# Patient Record
Sex: MALE | Race: WHITE | Employment: OTHER | URBAN - METROPOLITAN AREA
[De-identification: names, ages, dates, MRNs, and addresses within clinical notes are randomized per-mention and may not be internally consistent; named-entity substitution may affect disease eponyms.]

---

## 2018-04-23 ENCOUNTER — OP HISTORICAL/CONVERTED ENCOUNTER (OUTPATIENT)
Dept: OTHER | Age: 73
End: 2018-04-23

## 2018-05-24 ENCOUNTER — IP HISTORICAL/CONVERTED ENCOUNTER (OUTPATIENT)
Dept: OTHER | Age: 73
End: 2018-05-24

## 2021-02-19 ENCOUNTER — TRANSCRIBE ORDER (OUTPATIENT)
Dept: SCHEDULING | Age: 76
End: 2021-02-19

## 2021-02-19 DIAGNOSIS — R10.9 LEFT FLANK PAIN: Primary | ICD-10-CM

## 2021-02-19 DIAGNOSIS — D69.6 THROMBOCYTOPENIA (HCC): ICD-10-CM

## 2021-03-02 ENCOUNTER — HOSPITAL ENCOUNTER (OUTPATIENT)
Dept: CT IMAGING | Age: 76
Discharge: HOME | End: 2021-03-02
Attending: FAMILY MEDICINE
Payer: MEDICARE

## 2021-03-02 DIAGNOSIS — D69.6 THROMBOCYTOPENIA (HCC): ICD-10-CM

## 2021-03-02 DIAGNOSIS — R10.9 LEFT FLANK PAIN: ICD-10-CM

## 2021-03-02 PROCEDURE — 74011000636 HC RX REV CODE- 636: Performed by: FAMILY MEDICINE

## 2021-03-02 PROCEDURE — 74177 CT ABD & PELVIS W/CONTRAST: CPT

## 2021-03-02 RX ADMIN — IOPAMIDOL 100 ML: 755 INJECTION, SOLUTION INTRAVENOUS at 14:26

## 2023-06-28 ENCOUNTER — HOSPITAL ENCOUNTER (OUTPATIENT)
Facility: HOSPITAL | Age: 78
Discharge: HOME | End: 2023-07-01
Payer: MEDICARE

## 2023-06-28 DIAGNOSIS — R31.0 GROSS HEMATURIA: ICD-10-CM

## 2023-06-28 LAB — CREAT BLD-MCNC: 1.1 MG/DL (ref 0.6–1.3)

## 2023-06-28 PROCEDURE — 6360000004 HC RX CONTRAST MEDICATION: Performed by: FAMILY MEDICINE

## 2023-06-28 PROCEDURE — 74178 CT ABD&PLV WO CNTR FLWD CNTR: CPT

## 2023-06-28 PROCEDURE — 82565 ASSAY OF CREATININE: CPT

## 2023-06-28 RX ADMIN — IOPAMIDOL 100 ML: 755 INJECTION, SOLUTION INTRAVENOUS at 12:17

## 2023-10-10 ENCOUNTER — OFFICE VISIT (OUTPATIENT)
Age: 78
End: 2023-10-10
Payer: MEDICARE

## 2023-10-10 VITALS
TEMPERATURE: 97.8 F | HEIGHT: 69 IN | DIASTOLIC BLOOD PRESSURE: 74 MMHG | WEIGHT: 168 LBS | HEART RATE: 78 BPM | OXYGEN SATURATION: 99 % | BODY MASS INDEX: 24.88 KG/M2 | SYSTOLIC BLOOD PRESSURE: 102 MMHG

## 2023-10-10 DIAGNOSIS — N40.1 BENIGN PROSTATIC HYPERPLASIA WITH WEAK URINARY STREAM: ICD-10-CM

## 2023-10-10 DIAGNOSIS — R35.0 FREQUENCY OF URINATION: Primary | ICD-10-CM

## 2023-10-10 DIAGNOSIS — R31.0 GROSS HEMATURIA: ICD-10-CM

## 2023-10-10 DIAGNOSIS — R39.12 BENIGN PROSTATIC HYPERPLASIA WITH WEAK URINARY STREAM: ICD-10-CM

## 2023-10-10 LAB
BILIRUBIN, URINE, POC: NEGATIVE
BLOOD URINE, POC: NEGATIVE
GLUCOSE URINE, POC: NEGATIVE
KETONES, URINE, POC: NEGATIVE
LEUKOCYTE ESTERASE, URINE, POC: NEGATIVE
NITRITE, URINE, POC: NEGATIVE
PH, URINE, POC: 5.5 (ref 4.6–8)
PROTEIN,URINE, POC: NEGATIVE
SPECIFIC GRAVITY, URINE, POC: 1.02 (ref 1–1.03)
URINALYSIS CLARITY, POC: CLEAR
URINALYSIS COLOR, POC: YELLOW
UROBILINOGEN, POC: NORMAL

## 2023-10-10 PROCEDURE — 99204 OFFICE O/P NEW MOD 45 MIN: CPT | Performed by: UROLOGY

## 2023-10-10 PROCEDURE — G8427 DOCREV CUR MEDS BY ELIG CLIN: HCPCS | Performed by: UROLOGY

## 2023-10-10 PROCEDURE — 81003 URINALYSIS AUTO W/O SCOPE: CPT | Performed by: UROLOGY

## 2023-10-10 PROCEDURE — G8420 CALC BMI NORM PARAMETERS: HCPCS | Performed by: UROLOGY

## 2023-10-10 PROCEDURE — 4004F PT TOBACCO SCREEN RCVD TLK: CPT | Performed by: UROLOGY

## 2023-10-10 PROCEDURE — 1123F ACP DISCUSS/DSCN MKR DOCD: CPT | Performed by: UROLOGY

## 2023-10-10 PROCEDURE — G8484 FLU IMMUNIZE NO ADMIN: HCPCS | Performed by: UROLOGY

## 2023-10-10 RX ORDER — CYCLOBENZAPRINE HCL 5 MG
5 TABLET ORAL
COMMUNITY
Start: 2023-08-07

## 2023-10-10 RX ORDER — LOSARTAN POTASSIUM 50 MG/1
50 TABLET ORAL
COMMUNITY
Start: 2023-10-05

## 2023-10-10 RX ORDER — TADALAFIL 5 MG/1
5 TABLET ORAL DAILY
COMMUNITY
Start: 2022-02-23

## 2023-10-10 RX ORDER — FINASTERIDE 5 MG/1
5 TABLET, FILM COATED ORAL DAILY
COMMUNITY
Start: 2023-07-26

## 2023-10-10 RX ORDER — BENZONATATE 200 MG/1
CAPSULE ORAL
COMMUNITY
Start: 2023-09-18

## 2023-10-10 RX ORDER — DILTIAZEM HYDROCHLORIDE 120 MG/1
120 CAPSULE, COATED, EXTENDED RELEASE ORAL DAILY
Qty: 30 CAPSULE | Refills: 11 | COMMUNITY
Start: 2023-07-12 | End: 2024-07-11

## 2023-10-10 RX ORDER — RIVAROXABAN 15 MG/1
15 TABLET, FILM COATED ORAL DAILY
COMMUNITY
Start: 2023-09-14

## 2023-10-10 RX ORDER — DOXYCYCLINE HYCLATE 100 MG/1
CAPSULE ORAL
COMMUNITY
Start: 2023-09-18

## 2023-10-10 RX ORDER — ATORVASTATIN CALCIUM 20 MG/1
20 TABLET, FILM COATED ORAL DAILY
COMMUNITY
Start: 2023-08-07

## 2023-10-10 RX ORDER — TAMSULOSIN HYDROCHLORIDE 0.4 MG/1
CAPSULE ORAL
COMMUNITY
Start: 2023-09-15

## 2023-10-10 NOTE — PROGRESS NOTES
HISTORY OF PRESENT ILLNESS  Maddy Jensen is a 78 y.o. male. has no past medical history on file. has no past surgical history on file. Chief Complaint   Patient presents with    Annual Exam    New Patient    Urinary Urgency    Urinary Frequency at Night    Other     Incomplete bladder emptying for years      Years of urinary frequency and incomplete emptying. May he had gross hematuria. It was after intercourse. He associates dark urine after ejaculation. He has thrombocytopenia and also takes a blood thinner. He sees Dr. Brennon Young. He went to [facility] and decided to have another opinion. He has not consistently seen the same doctor. He had a CT around 7/3/23 with a tiny left kidney stone. He had prostate enlargement. PSA 0.87 6/2021. Tamsulosin for BPH. He is also on tadalafil 5mg daily. He is on finasteride. He notes nocturia several times. It is stable and he is used to it. Daytime he denies hesitancy but notes a weaker stream.  He does not feel he empties. 1. Frequency of urination  Assessment & Plan: On finasteride, tamsulosin and tadalafil. Still bothered. Check cmg/uroflow/ PVR  Orders:  -     AMB POC URINALYSIS DIP STICK AUTO W/O MICRO  2. Benign prostatic hyperplasia with weak urinary stream  Assessment & Plan:  Subjective incomplete emptying and weaker urine stream.  We will evaluate. 3. Gross hematuria  Assessment & Plan:  Recurrent hematuria. Plan on cystoscopy. Past Medical History:  PMHx (including negatives):  has no past medical history on file. PSurgHx:  has no past surgical history on file. PSocHx:     FamilyHX: No family history on file. Review of Systems   Cardiovascular:  Positive for palpitations (intermittent afib). All other systems reviewed and are negative. Physical Exam  Constitutional:       General: He is not in acute distress. Appearance: He is not ill-appearing.    HENT:      Head: Normocephalic and

## 2023-11-08 PROBLEM — R35.0 FREQUENCY OF URINATION: Status: RESOLVED | Noted: 2023-10-10 | Resolved: 2023-11-08

## 2023-11-08 NOTE — ASSESSMENT & PLAN NOTE
BPH with feelings of incomplete emptying (subjective), frequency, weak stream. On tamsulosin and finasteride. Flow is moderate to severely restricted. We discussed management options. I would offer a laser enucleation of the prostate. This would address the flow and likely the chronic hematuria. The patient was counseled on the risks, benefits and expected course of surgery. Surgery has risks of bleeding, infection, injury, pain, death or other consequences. Perioperative medications and antibiotic use were discussed including the potential for reactions and side effects. Some specific risks of surgery were discussed as well. He will consider this. He will call to discuss it further if he wishes to proceed.

## 2023-11-10 ENCOUNTER — PROCEDURE VISIT (OUTPATIENT)
Age: 78
End: 2023-11-10
Payer: MEDICARE

## 2023-11-10 VITALS
HEIGHT: 69 IN | OXYGEN SATURATION: 100 % | HEART RATE: 79 BPM | TEMPERATURE: 97.1 F | DIASTOLIC BLOOD PRESSURE: 91 MMHG | WEIGHT: 168 LBS | SYSTOLIC BLOOD PRESSURE: 143 MMHG | BODY MASS INDEX: 24.88 KG/M2

## 2023-11-10 DIAGNOSIS — R39.12 BENIGN PROSTATIC HYPERPLASIA WITH WEAK URINARY STREAM: ICD-10-CM

## 2023-11-10 DIAGNOSIS — Z87.438 H/O PROSTATITIS: ICD-10-CM

## 2023-11-10 DIAGNOSIS — N35.112 POSTINFECTIVE STRICTURE OF BULBOUS URETHRA: ICD-10-CM

## 2023-11-10 DIAGNOSIS — N40.1 BENIGN PROSTATIC HYPERPLASIA WITH WEAK URINARY STREAM: ICD-10-CM

## 2023-11-10 DIAGNOSIS — R31.0 GROSS HEMATURIA: Primary | ICD-10-CM

## 2023-11-10 DIAGNOSIS — R31.0 GROSS HEMATURIA: ICD-10-CM

## 2023-11-10 LAB
AVG FLOW RATE, POC: 7
BILIRUBIN, URINE, POC: NEGATIVE
BLOOD URINE, POC: NORMAL
FLOW TIME, POC: 29
GLUCOSE URINE, POC: NEGATIVE
KETONES, URINE, POC: NEGATIVE
LEUKOCYTE ESTERASE, URINE, POC: NORMAL
MAX FLOW RATE, POC: 11
NITRITE, URINE, POC: NEGATIVE
PH, URINE, POC: 7 (ref 4.6–8)
PROTEIN,URINE, POC: NEGATIVE
PVR, POC: NORMAL CC
SPECIFIC GRAVITY, URINE, POC: 1.02 (ref 1–1.03)
TIME TO MAX, POC: 4
URINALYSIS CLARITY, POC: CLEAR
URINALYSIS COLOR, POC: YELLOW
UROBILINOGEN, POC: NORMAL
VOIDED VOLUME, POC: NORMAL
VOIDING TIME, POC: 30

## 2023-11-10 PROCEDURE — 52281 CYSTOSCOPY AND TREATMENT: CPT | Performed by: UROLOGY

## 2023-11-10 PROCEDURE — G8484 FLU IMMUNIZE NO ADMIN: HCPCS | Performed by: UROLOGY

## 2023-11-10 PROCEDURE — 51741 ELECTRO-UROFLOWMETRY FIRST: CPT | Performed by: UROLOGY

## 2023-11-10 PROCEDURE — 4004F PT TOBACCO SCREEN RCVD TLK: CPT | Performed by: UROLOGY

## 2023-11-10 PROCEDURE — 1123F ACP DISCUSS/DSCN MKR DOCD: CPT | Performed by: UROLOGY

## 2023-11-10 PROCEDURE — G8420 CALC BMI NORM PARAMETERS: HCPCS | Performed by: UROLOGY

## 2023-11-10 PROCEDURE — 51725 SIMPLE CYSTOMETROGRAM: CPT | Performed by: UROLOGY

## 2023-11-10 PROCEDURE — 51798 US URINE CAPACITY MEASURE: CPT | Performed by: UROLOGY

## 2023-11-10 PROCEDURE — 99213 OFFICE O/P EST LOW 20 MIN: CPT | Performed by: UROLOGY

## 2023-11-10 PROCEDURE — 81003 URINALYSIS AUTO W/O SCOPE: CPT | Performed by: UROLOGY

## 2023-11-10 PROCEDURE — G8427 DOCREV CUR MEDS BY ELIG CLIN: HCPCS | Performed by: UROLOGY

## 2023-11-10 NOTE — PROGRESS NOTES
Cystoscopy /dilation - Male  He had gross hematuria, frequency, incomplete emptying, nocturia, hesitancy and weak stream.    Findings:    Initial residual: minimal  Anterior urethra: thin bulbar urethral stricture about 14F, dilated with the scope     Prostate: inclusions and polyps, friable. Moderate coapting lateral lobes  Bladder neck: Normal appearing  Bladder mucosa:  no tumors   no erythema      Trabeculation: 2-3+  Diverticula: none  Ureteral orifices: normal, efflux clear urine    CMG    Initial urge at (cc): 150  Strong urge at (cc): 225  Findings: No uninhibited contractions noted. No urge related incontinence noted    Uroflow/ PVR    Max Flow: 11 ml/sec    Avg flow: 7 ml/sec    Voided Volume:  250ml    Residual Volume:0ml    Shape of the curve: flattened curve    Impression: moderate to severe decrease in flow. Bulbar stricture, dilated. Prostate with chronic inflammatory changes. BPH and prostatic bleeding.

## 2023-11-10 NOTE — PROGRESS NOTES
HISTORY OF PRESENT ILLNESS  Tiffanie Rossi is a 78 y.o. male. has a past medical history of A-fib ([address]), Anticoagulated, Dyslipidemia, HTN (hypertension), and Thrombocytopenia ([address] has no past surgical history on file. Chief Complaint   Patient presents with    Cystoscopy    Urinary Frequency    Hematuria     HPI    1. Gross hematuria  Overview:  Recurrent hematuria, tiny stone on CT 7/2023. Assessment & Plan:  Due to BPH and prostate inflammation. Likely to be recurrent on anticoagulation. Friable enlarged prostate. He is advised to hydrate and hold anticoagulation if persistent blood in the urine. Orders:  -     AMB POC URINALYSIS DIP STICK AUTO W/O MICRO  -     Urinalysis with Microscopic  -     Culture, Urine  -     CBC; Future  -     Comprehensive Metabolic Panel; Future  2. Benign prostatic hyperplasia with weak urinary stream  Assessment & Plan:  BPH with feelings of incomplete emptying (subjective), frequency, weak stream. On tamsulosin and finasteride. Flow is moderate to severely restricted. We discussed management options. I would offer a laser enucleation of the prostate. This would address the flow and likely the chronic hematuria. The patient was counseled on the risks, benefits and expected course of surgery. Surgery has risks of bleeding, infection, injury, pain, death or other consequences. Perioperative medications and antibiotic use were discussed including the potential for reactions and side effects. Some specific risks of surgery were discussed as well. He will consider this. He will call to discuss it further if he wishes to proceed. Orders:  -     AMB POC URINALYSIS DIP STICK AUTO W/O MICRO  -     AMB POC UROFLOWMETRY  -     AMB POC PVR, NITHIN,POST-VOID RES,US,NON-IMAGING  -     Urinalysis with Microscopic  -     Culture, Urine  -     CBC; Future  -     Comprehensive Metabolic Panel; Future  3.  Postinfective stricture of bulbous urethra  Assessment &

## 2023-11-10 NOTE — ASSESSMENT & PLAN NOTE
H/o prostatitis in his middle age.   Evidence of chronic inflammatory changes to the prostate on cysto

## 2023-11-10 NOTE — ASSESSMENT & PLAN NOTE
Due to BPH and prostate inflammation. Likely to be recurrent on anticoagulation. Friable enlarged prostate. He is advised to hydrate and hold anticoagulation if persistent blood in the urine.

## 2023-11-11 LAB
APPEARANCE UR: CLEAR
BACTERIA #/AREA URNS HPF: ABNORMAL /[HPF]
BILIRUB UR QL STRIP: NEGATIVE
CASTS URNS QL MICRO: ABNORMAL /LPF
COLOR UR: YELLOW
CRYSTALS URNS MICRO: ABNORMAL
EPI CELLS #/AREA URNS HPF: ABNORMAL /HPF (ref 0–10)
GLUCOSE UR QL STRIP: NEGATIVE
HGB UR QL STRIP: NEGATIVE
KETONES UR QL STRIP: NEGATIVE
LEUKOCYTE ESTERASE UR QL STRIP: ABNORMAL
MICRO URNS: ABNORMAL
NITRITE UR QL STRIP: NEGATIVE
PH UR STRIP: 6.5 [PH] (ref 5–7.5)
PROT UR QL STRIP: NEGATIVE
RBC #/AREA URNS HPF: ABNORMAL /HPF (ref 0–2)
SP GR UR STRIP: 1.02 (ref 1–1.03)
UNIDENT CRYS URNS QL MICRO: PRESENT
UROBILINOGEN UR STRIP-MCNC: 1 MG/DL (ref 0.2–1)
WBC #/AREA URNS HPF: ABNORMAL /HPF (ref 0–5)

## 2023-11-14 ENCOUNTER — TELEPHONE (OUTPATIENT)
Age: 78
End: 2023-11-14

## 2023-11-14 LAB — BACTERIA UR CULT: NO GROWTH

## 2023-11-14 NOTE — TELEPHONE ENCOUNTER
Pt called stating since cysto he has had burning and discomfort when urinating. He would like advice on what to do.

## 2023-11-15 NOTE — TELEPHONE ENCOUNTER
He should come leave a sample so we can rule out infection. Should clear up within 48 hours and his cysto was 5 days ago- so we should check for infection.

## 2023-11-17 ENCOUNTER — OFFICE VISIT (OUTPATIENT)
Age: 78
End: 2023-11-17
Payer: MEDICARE

## 2023-11-17 VITALS
HEIGHT: 70 IN | DIASTOLIC BLOOD PRESSURE: 81 MMHG | HEART RATE: 95 BPM | SYSTOLIC BLOOD PRESSURE: 132 MMHG | WEIGHT: 168 LBS | OXYGEN SATURATION: 100 % | TEMPERATURE: 98.1 F | BODY MASS INDEX: 24.05 KG/M2

## 2023-11-17 DIAGNOSIS — R31.0 GROSS HEMATURIA: ICD-10-CM

## 2023-11-17 DIAGNOSIS — N40.1 BENIGN PROSTATIC HYPERPLASIA WITH WEAK URINARY STREAM: ICD-10-CM

## 2023-11-17 DIAGNOSIS — R39.12 BENIGN PROSTATIC HYPERPLASIA WITH WEAK URINARY STREAM: ICD-10-CM

## 2023-11-17 DIAGNOSIS — R30.0 DYSURIA: Primary | ICD-10-CM

## 2023-11-17 DIAGNOSIS — R82.81 PYURIA: ICD-10-CM

## 2023-11-17 DIAGNOSIS — N41.0 ACUTE PROSTATITIS: ICD-10-CM

## 2023-11-17 LAB
BILIRUBIN, URINE, POC: NEGATIVE
BLOOD URINE, POC: NEGATIVE
GLUCOSE URINE, POC: NEGATIVE
KETONES, URINE, POC: ABNORMAL
LEUKOCYTE ESTERASE, URINE, POC: ABNORMAL
NITRITE, URINE, POC: NEGATIVE
PH, URINE, POC: 5.5 (ref 4.6–8)
PROTEIN,URINE, POC: NEGATIVE
SPECIFIC GRAVITY, URINE, POC: 1.02 (ref 1–1.03)
URINALYSIS CLARITY, POC: CLEAR
URINALYSIS COLOR, POC: YELLOW
UROBILINOGEN, POC: NORMAL

## 2023-11-17 PROCEDURE — 4004F PT TOBACCO SCREEN RCVD TLK: CPT | Performed by: UROLOGY

## 2023-11-17 PROCEDURE — 99214 OFFICE O/P EST MOD 30 MIN: CPT | Performed by: UROLOGY

## 2023-11-17 PROCEDURE — G8484 FLU IMMUNIZE NO ADMIN: HCPCS | Performed by: UROLOGY

## 2023-11-17 PROCEDURE — 1123F ACP DISCUSS/DSCN MKR DOCD: CPT | Performed by: UROLOGY

## 2023-11-17 PROCEDURE — G8427 DOCREV CUR MEDS BY ELIG CLIN: HCPCS | Performed by: UROLOGY

## 2023-11-17 PROCEDURE — 81003 URINALYSIS AUTO W/O SCOPE: CPT | Performed by: UROLOGY

## 2023-11-17 PROCEDURE — G8420 CALC BMI NORM PARAMETERS: HCPCS | Performed by: UROLOGY

## 2023-11-17 RX ORDER — LEVOFLOXACIN 500 MG/1
500 TABLET, FILM COATED ORAL DAILY
Qty: 10 TABLET | Refills: 1 | Status: SHIPPED | OUTPATIENT
Start: 2023-11-17

## 2023-11-17 NOTE — ASSESSMENT & PLAN NOTE
Enlarged prostate and weak stream. He was counseled on a deobstructing procedure. We discussed [facility] 2208 laser enucleation of the prostate. He is still deciding on that.

## 2023-11-17 NOTE — PROGRESS NOTES
HISTORY OF PRESENT ILLNESS  Jose Tejeda is a 78 y.o. male. has a past medical history of A-fib ([address]), Anticoagulated, Dyslipidemia, HTN (hypertension), and Thrombocytopenia ([address] has no past surgical history on file. Chief Complaint   Patient presents with    Follow-up    buning urine    urine discomfort     He was last seen last week with BPH/ prostatitis. He has a burning sensation since the procedure. It is worse with voiding. Stable weak stream.  He had hematuria 2 days. He held his anticoagulation as instructed. It cleared. He resumed his anticoagulation and his urine is now clear. UA with pyuria, sent for culture. He recognizes his has anxiety because he lives alone. 1. Dysuria  -     AMB POC URINALYSIS DIP STICK AUTO W/O MICRO  -     Culture, Urine  -     levoFLOXacin (LEVAQUIN) 500 MG tablet; Take 1 tablet by mouth daily, Disp-10 tablet, R-1Normal  2. Pyuria  -     AMB POC URINALYSIS DIP STICK AUTO W/O MICRO  -     Culture, Urine  -     levoFLOXacin (LEVAQUIN) 500 MG tablet; Take 1 tablet by mouth daily, Disp-10 tablet, R-1Normal  3. Acute prostatitis  Assessment & Plan:  Worse dysuria and burning. I will treat him for prostatitis. The patient was instructed on the dosing of the medication and reason for taking it. We discussed the common and serious risks. The patient is advised to be cautious of side effects with a new medication. Orders:  -     AMB POC URINALYSIS DIP STICK AUTO W/O MICRO  -     Culture, Urine  -     levoFLOXacin (LEVAQUIN) 500 MG tablet; Take 1 tablet by mouth daily, Disp-10 tablet, R-1Normal  4. Benign prostatic hyperplasia with weak urinary stream  Assessment & Plan:   Enlarged prostate and weak stream. He was counseled on a deobstructing procedure. We discussed [id number] 1731 laser enucleation of the prostate. He is still deciding on that.      Orders:  -     AMB POC URINALYSIS DIP STICK AUTO W/O MICRO  5. Gross hematuria  Overview:  Recurrent

## 2023-11-17 NOTE — ASSESSMENT & PLAN NOTE
Blood in the urine after cystoscopy last week x 2 days. He held his anticoagulation and it cleared. He resumed anticoagulation.

## 2023-11-21 LAB — BACTERIA UR CULT: NO GROWTH

## 2023-12-06 ENCOUNTER — TELEPHONE (OUTPATIENT)
Age: 78
End: 2023-12-06

## 2023-12-06 NOTE — TELEPHONE ENCOUNTER
PT called stating condition has not improved and the burning/discomfort urinating has persisted. Should I call pt back and tell him to come give a sample to test for infection?

## 2023-12-07 NOTE — PROGRESS NOTES
HISTORY OF PRESENT ILLNESS  Jorden Chaney is a 78 y.o. male. Chief Complaint   Patient presents with    Follow-up    Urinary Tract Infection     11/10/23 he underwent cystoscopic evaluation of his hematuria and dysuria. He was found to have a friable prostate, enlarged. He was counseled on LEMP.    11/17/23: he had ongoing dysuria. Hematuria resolved. His anxiety was higher, admittedly. He lives alone. He had pyuria but no growth on culture. He was given a course of Levaquin for acute prostatitis. He was given 10 tablets with one refill. Today he is here with ongoing dysuria. PAST MEDICAL HISTORY:  PMHx (including negatives):  has a past medical history of A-fib ([address]), Anticoagulated, Dyslipidemia, HTN (hypertension), and Thrombocytopenia ([address] PSurgHx:  has no past surgical history on file. PSocHx:       Review of Systems   Genitourinary:  Positive for dysuria. Negative for difficulty urinating, flank pain, frequency, penile pain, testicular pain and urgency. Physical Exam  Vitals reviewed. Constitutional:       Appearance: Normal appearance. He is not ill-appearing. Pulmonary:      Effort: Pulmonary effort is normal.   Genitourinary:     Comments: Voiding ok  Musculoskeletal:      Cervical back: Normal range of motion. Skin:     General: Skin is warm and dry. Neurological:      Mental Status: He is alert and oriented to person, place, and time. Psychiatric:         Mood and Affect: Mood normal.         Behavior: Behavior normal.       ASSESSMENT AND PLAN    Acute prostatitis  Assessment & Plan:  The patient was advised that his symptoms are consistent with prostatitis and exacerbated by anxiety, stress, and dehydration. I switched him to doxycycline from Levaquin. He was given Uribel for dysuria. He was told to take it bid to start. The patient was instructed on the dosing of the medications and reason for taking them. We discussed the common and serious risks.  The

## 2023-12-07 NOTE — ASSESSMENT & PLAN NOTE
The patient was advised that his symptoms are consistent with prostatitis and exacerbated by anxiety, stress, and dehydration. I switched him to doxycycline from Levaquin. He was given Uribel for dysuria. He was told to take it bid to start. The patient was instructed on the dosing of the medications and reason for taking them. We discussed the common and serious risks. The patient is advised to be cautious of side effects with any and all new medications. He was also advised that prostatitis is a painful or bothersome condition caused by inflammation or infection of the prostate gland. Prostatitis is treated with self-care and antibiotics. The patient has been educated on the condition and given an extended course of antibiotics and verbal and written instruction on how to manage this condition. The patient was instructed on the dosing of the medication and reason for taking it. We discussed the common and serious risks. The patient is advised to be cautious of side effects with a new medication. He can use NSAID's or OTC anti-inflammatory agents to reduce the discomfort. Use as directed. He can use a donut-shaped pillow or cushion for comfort with prolonged sitting and/or driving. He should hydrate well, decrease caffeine consumption, avoid spicy foods, and work on stress management. Follow up as directed.

## 2023-12-07 NOTE — PATIENT INSTRUCTIONS
30 to 60 minutes a day on 5 or more days of the week. Take a fiber supplement, such as Citrucel or Metamucil, every day if needed. Read and follow all instructions on the label. Schedule time each day for a bowel movement. Having a daily routine may help. Take your time and do not strain when having a bowel movement. Avoid alcohol, caffeine, and spicy foods, especially if they make your symptoms worse. Stress can contribute to many health problems and can exacerbate symptoms of prostate inflammation. Reducing and elminating stress and anxiety from your daily life can help to lessen the recurrence of some of your urinary symptoms. You can best identify stress reduction strategies that work for you (exercise, meditation, hiking, etc.). You can use a gel cushion seat if you drive a lot. You can get them on [employer], at [employer], and most drug stores. There are a number of OTC supplements. We recommend that you consider Theralogix Prostate PQ with rye grass flower pollen and quercetin. This is an evidence based formulation, NSF approved and registered. Visit www. [employer]. [employer].com or call 5-844.297.5660 for ordering.       URIBEL  Methenamine, sodium biphosphate, phenyl salicylate, methylene blue, and hyoscyamine: Drug information   Pharmacologic Category   Antibiotic, Miscellaneous  Dosing: Adult   Urinary tract symptoms: Oral: One tablet 4 times daily (follow by liberal fluid intake)  Generic Equivalent Available: [place]   No  Use: Labeled Indications   Treatment of symptoms of irritative voiding; relief of local symptoms associated with urinary tract infections; relief of urinary tract symptoms caused by diagnostic procedures  Medication Safety Issues   Geriatric Patients: High-Risk Medication:   Beers Criteria: Hyoscyamine is identified in the Beers Criteria as a potentially inappropriate medication to be avoided in patients 65 years and older (independent of diagnosis or condition) due to its

## 2023-12-08 ENCOUNTER — OFFICE VISIT (OUTPATIENT)
Age: 78
End: 2023-12-08
Payer: MEDICARE

## 2023-12-08 VITALS — SYSTOLIC BLOOD PRESSURE: 129 MMHG | DIASTOLIC BLOOD PRESSURE: 84 MMHG | HEART RATE: 80 BPM

## 2023-12-08 DIAGNOSIS — N41.0 ACUTE PROSTATITIS: Primary | ICD-10-CM

## 2023-12-08 DIAGNOSIS — R30.0 DYSURIA: ICD-10-CM

## 2023-12-08 LAB
BILIRUBIN, URINE, POC: NEGATIVE
BLOOD URINE, POC: NORMAL
GLUCOSE URINE, POC: NEGATIVE
KETONES, URINE, POC: NEGATIVE
LEUKOCYTE ESTERASE, URINE, POC: NEGATIVE
NITRITE, URINE, POC: NEGATIVE
PH, URINE, POC: 6 (ref 4.6–8)
PROTEIN,URINE, POC: NEGATIVE
SPECIFIC GRAVITY, URINE, POC: 1.02 (ref 1–1.03)
URINALYSIS CLARITY, POC: CLEAR
URINALYSIS COLOR, POC: YELLOW
UROBILINOGEN, POC: NORMAL

## 2023-12-08 PROCEDURE — 4004F PT TOBACCO SCREEN RCVD TLK: CPT | Performed by: NURSE PRACTITIONER

## 2023-12-08 PROCEDURE — G8428 CUR MEDS NOT DOCUMENT: HCPCS | Performed by: NURSE PRACTITIONER

## 2023-12-08 PROCEDURE — 81003 URINALYSIS AUTO W/O SCOPE: CPT | Performed by: NURSE PRACTITIONER

## 2023-12-08 PROCEDURE — G8484 FLU IMMUNIZE NO ADMIN: HCPCS | Performed by: NURSE PRACTITIONER

## 2023-12-08 PROCEDURE — 99213 OFFICE O/P EST LOW 20 MIN: CPT | Performed by: NURSE PRACTITIONER

## 2023-12-08 PROCEDURE — 1123F ACP DISCUSS/DSCN MKR DOCD: CPT | Performed by: NURSE PRACTITIONER

## 2023-12-08 PROCEDURE — G8420 CALC BMI NORM PARAMETERS: HCPCS | Performed by: NURSE PRACTITIONER

## 2023-12-08 RX ORDER — DOXYCYCLINE HYCLATE 100 MG
100 TABLET ORAL 2 TIMES DAILY
Qty: 28 TABLET | Refills: 0 | Status: SHIPPED | OUTPATIENT
Start: 2023-12-08 | End: 2023-12-08 | Stop reason: SDUPTHER

## 2023-12-08 RX ORDER — DOXYCYCLINE HYCLATE 100 MG
100 TABLET ORAL 2 TIMES DAILY
Qty: 42 TABLET | Refills: 0 | Status: SHIPPED | OUTPATIENT
Start: 2023-12-08 | End: 2023-12-29

## 2023-12-08 RX ORDER — METHENAMINE, SODIUM PHOSPHATE, MONOBASIC, MONOHYDRATE, PHENYL SALICYLATE, METHYLENE BLUE, AND HYOSCYAMINE SULFATE 118; 40.8; 36; 10; .12 MG/1; MG/1; MG/1; MG/1; MG/1
118 CAPSULE ORAL 2 TIMES DAILY
Qty: 60 CAPSULE | Refills: 0 | Status: SHIPPED | OUTPATIENT
Start: 2023-12-08 | End: 2024-01-07

## 2024-01-11 ENCOUNTER — TELEPHONE (OUTPATIENT)
Age: 79
End: 2024-01-11

## 2024-01-11 NOTE — TELEPHONE ENCOUNTER
Pt called back with additional concerns. He needs to see . Not NP can you change his pt to tomharleyow at 830 with . I already spoke to him and confirmed the day and time

## 2024-01-11 NOTE — PROGRESS NOTES
Chief Complaint   Patient presents with    Follow-up    Urinary Tract Infection        /84   Pulse 80      PHQ-9 score is    Negative      1. \"Have you been to the ER, urgent care clinic since your last visit? Hospitalized since your last visit? \" No    2. \"Have you seen or consulted any other health care providers outside of the [address] [place] [address] since your last visit? \" No     3. For patients aged 43-73: Has the patient had a colonoscopy / FIT/ Cologuard? Yes - no Care Gap present      If the patient is female:    4. For patients aged 43-66: Has the patient had a mammogram within the past 2 years? NA - based on age or sex      11. For patients aged 21-65: Has the patient had a pap smear?  NA - based on age or sex Scheduled C/S

## 2024-01-11 NOTE — TELEPHONE ENCOUNTER
PT CONTACTED THE OFFICE STATING HE SEEN ONE OF THE NPS LAST MONTH FOR PROSTATITIS AND WAS PUT ON DOXYCYCLINE. BUT IS STILL HAVING SYMPTOMS. WANTED TO BE SEEN BY DR ROMERO TO BE EVALUATED FURTHER. I TRANSFERRED HIM UPFRONT TO SCHEDULE

## 2024-01-12 ENCOUNTER — OFFICE VISIT (OUTPATIENT)
Age: 79
End: 2024-01-12
Payer: MEDICARE

## 2024-01-12 VITALS
DIASTOLIC BLOOD PRESSURE: 81 MMHG | WEIGHT: 168 LBS | OXYGEN SATURATION: 99 % | SYSTOLIC BLOOD PRESSURE: 124 MMHG | HEIGHT: 70 IN | RESPIRATION RATE: 16 BRPM | HEART RATE: 99 BPM | BODY MASS INDEX: 24.05 KG/M2

## 2024-01-12 DIAGNOSIS — N40.1 BENIGN PROSTATIC HYPERPLASIA WITH WEAK URINARY STREAM: ICD-10-CM

## 2024-01-12 DIAGNOSIS — R39.12 BENIGN PROSTATIC HYPERPLASIA WITH WEAK URINARY STREAM: ICD-10-CM

## 2024-01-12 DIAGNOSIS — N41.1 PROSTATITIS, CHRONIC: Primary | ICD-10-CM

## 2024-01-12 PROBLEM — N41.0 ACUTE PROSTATITIS: Status: RESOLVED | Noted: 2023-11-10 | Resolved: 2024-01-12

## 2024-01-12 LAB
BILIRUBIN, URINE, POC: NEGATIVE
BLOOD URINE, POC: NEGATIVE
GLUCOSE URINE, POC: NEGATIVE
KETONES, URINE, POC: NEGATIVE
LEUKOCYTE ESTERASE, URINE, POC: NEGATIVE
NITRITE, URINE, POC: NEGATIVE
PH, URINE, POC: 6.5 (ref 4.6–8)
PROTEIN,URINE, POC: NEGATIVE
SPECIFIC GRAVITY, URINE, POC: 1.02 (ref 1–1.03)
URINALYSIS CLARITY, POC: CLEAR
URINALYSIS COLOR, POC: YELLOW
UROBILINOGEN, POC: NORMAL

## 2024-01-12 PROCEDURE — G8427 DOCREV CUR MEDS BY ELIG CLIN: HCPCS | Performed by: UROLOGY

## 2024-01-12 PROCEDURE — G8420 CALC BMI NORM PARAMETERS: HCPCS | Performed by: UROLOGY

## 2024-01-12 PROCEDURE — 99214 OFFICE O/P EST MOD 30 MIN: CPT | Performed by: UROLOGY

## 2024-01-12 PROCEDURE — 81001 URINALYSIS AUTO W/SCOPE: CPT | Performed by: UROLOGY

## 2024-01-12 PROCEDURE — 4004F PT TOBACCO SCREEN RCVD TLK: CPT | Performed by: UROLOGY

## 2024-01-12 PROCEDURE — G8484 FLU IMMUNIZE NO ADMIN: HCPCS | Performed by: UROLOGY

## 2024-01-12 PROCEDURE — 1123F ACP DISCUSS/DSCN MKR DOCD: CPT | Performed by: UROLOGY

## 2024-01-12 RX ORDER — DOXYCYCLINE HYCLATE 100 MG/1
100 CAPSULE ORAL DAILY
Qty: 90 CAPSULE | Refills: 0 | Status: SHIPPED | OUTPATIENT
Start: 2024-01-12

## 2024-01-12 RX ORDER — AMLODIPINE BESYLATE 2.5 MG/1
2.5 TABLET ORAL DAILY
COMMUNITY
Start: 2023-11-30

## 2024-01-12 NOTE — PROGRESS NOTES
Chief Complaint   Patient presents with    Pain     Pain urethra     1. Have you been to the ER, urgent care clinic since your last visit?  Hospitalized since your last visit?No    2. Have you seen or consulted any other health care providers outside of the [facility] since your last visit?  Include any pap smears or colon screening. No  /81 (Site: Left Upper Arm, Position: Sitting, Cuff Size: Large Adult)   Pulse 99   Resp 16   Ht 1.778 m (5' 10\")   Wt 76.2 kg (168 lb)   SpO2 99%   BMI 24.11 kg/m²

## 2024-01-12 NOTE — ASSESSMENT & PLAN NOTE
He has a large prostate and weak stream.  He can manage with tamsulosin and finasteride.  He is not interested in surgery at this time .

## 2024-01-12 NOTE — PROGRESS NOTES
HISTORY OF PRESENT ILLNESS  Briana Angeles is a 78 y.o. male.   has a past medical history of A-fib (HCC), Anticoagulated, Dyslipidemia, HTN (hypertension), and Thrombocytopenia (HCC).  has no past surgical history on file.  Chief Complaint   Patient presents with    Pain     Pain urethra    Acute prostatitis     He has persistent penile discomfort. He has discomfort with sexual stimulation or erotic thoughts.  He was treated with Levaquin which did not help.   He feels a little better on prophylactic doxycycline.     He found uribel too costly to take.     BPH and weak stream.  We discussed a laser enucleation.  He is not ready for that due to his discomfort.       He had COVID last month. He is recovered.  He has less stamina.  He was coughing constantly.     Pain          1. Prostatitis, chronic  Assessment & Plan:  Chronic irritation.  We discussed suppressive doxycycline.  He is in favor of this. Rx sent.   Orders:  -     AMB POC URINALYSIS DIP STICK AUTO W/ MICRO  -     meth-hyo-m bl-caitlin acd-ph sal (URIBEL) 81.6 MG TABS; Take 1 tablet by mouth 4 times daily, Disp-120 tablet, R-3Normal  -     doxycycline hyclate (VIBRAMYCIN) 100 MG capsule; Take 1 capsule by mouth daily, Disp-90 capsule, R-0Normal  2. Benign prostatic hyperplasia with weak urinary stream  Overview:  BPH with weak stream.  He has been counseled on a LEMP, but is still deciding if he wants to pursue surgical intervention.  Assessment & Plan:   He has a large prostate and weak stream.  He can manage with tamsulosin and finasteride.  He is not interested in surgery at this time .      No Known Allergies     Past Medical History:  PMHx (including negatives):  has a past medical history of A-fib (HCC), Anticoagulated, Dyslipidemia, HTN (hypertension), and Thrombocytopenia (HCC).   PSurgHx:  has no past surgical history on file.  PSocHx:     FamilyHX: History reviewed. No pertinent family history.    Review of Systems    Physical

## 2024-01-16 ENCOUNTER — TELEPHONE (OUTPATIENT)
Age: 79
End: 2024-01-16

## 2024-01-16 DIAGNOSIS — N41.1 PROSTATITIS, CHRONIC: Primary | ICD-10-CM

## 2024-01-16 RX ORDER — NORTRIPTYLINE HYDROCHLORIDE 10 MG/1
10 CAPSULE ORAL 2 TIMES DAILY
Qty: 60 CAPSULE | Refills: 0 | Status: SHIPPED | OUTPATIENT
Start: 2024-01-16 | End: 2024-02-15

## 2024-01-16 NOTE — TELEPHONE ENCOUNTER
I called the pt and found the issue. I think helene found the price $28-30 for 30 capsules, the problem is the pt is rx'd 120 capsules as he has to take it four times a day. The lowest price for a 30 day supply, not factoring in the manufacturers discount would be $99. Should I advise the pt that besides the goodrx card and the coupon there is nothing more we can do?

## 2024-01-16 NOTE — TELEPHONE ENCOUNTER
Correction, for the 120 count tablet a 30 day supply would be $221 at the lowest. I had been looking at capsules instead of tablets. This is not factoring in the 's coupon, just the [employer] savings price.

## 2024-01-16 NOTE — TELEPHONE ENCOUNTER
I sent the RX in for him. It will be important to read all of the package inserts, but it does not alert me to any drug/drug interactions.  He is on a low dose, so the side effects should be very minimal.    He can follow up with either Dr. Ospina or myself.  I would aim for 4-6 weeks.

## 2024-01-16 NOTE — TELEPHONE ENCOUNTER
There is no substitute for Uribel.  We could try a low dose antidepressant if he wants to do that.  It has been shown to help with chronic discomfort at low doses.    If he wants to do that, I would want to see him 4-6 weeks after starting the medication so that we can make sure he is not having side effects.

## 2024-01-16 NOTE — TELEPHONE ENCOUNTER
Spoke with the pt and he would like to try the low dose anti-depressant as long as it doesn't interact with his other medications. Pt states he can't afford the uribel. He would like it sent to [facility] in [place]. I'll call to inform pt and schedule a follow up once it's been sent in, would that be with you or dr. Ospina?

## 2024-01-16 NOTE — TELEPHONE ENCOUNTER
There is no substitute medication, but the Good RX website (per Seth) shows that [facility] has the best price of $28-30.    Perhaps he can call the local [facility] to see if they honor that price. If not, there are other cheap options on the Good RX website he can explore AND online coupons he can print from that site too.

## 2024-01-16 NOTE — TELEPHONE ENCOUNTER
Pt called stating he was rx'd uribel at last appt and dr. Ospina gave him a coupon to have it filled. He stated not only is the pharmacy unable to fill it, but if it were filled it would be over $100 with the coupon. The pt would like to know if there is anything he can be rx'd in place of this medication. Please advise.

## 2024-02-12 NOTE — PATIENT INSTRUCTIONS
Prostatitis: Care Instructions  Overview     The prostate gland is a small, walnut-shaped organ. It lies just below a man's bladder. It surrounds the urethra, the tube that carries urine through the penis and out of the body.  Prostatitis is a painful condition caused by inflammation or infection of the prostate gland. Sometimes the condition is caused by bacteria, but often the cause is not known.  Prostatitis caused by bacteria usually is treated with self-care and antibiotics.  You may need extended courses of antibiotics (21-30 days).    How can you care for yourself at home?  If your doctor prescribed antibiotics, take them as directed. Do not stop taking them just because you feel better. You need to take the full course of antibiotics.  Take an over-the-counter pain medicine, such as acetaminophen (Tylenol), ibuprofen (Advil, Motrin), or naproxen (Aleve). Be safe with medicines. Read and follow all instructions on the label.  Take warm baths to help soothe pain.  Straining to pass stools can hurt when your prostate is inflamed. Avoid constipation.  Include fruits, vegetables, beans, and whole grains in your diet each day. These foods are high in fiber.  Drink plenty of fluids. If you have kidney, heart, or liver disease and have to limit fluids, talk with your doctor before you increase the amount of fluids you drink.  Get some exercise every day. Build up slowly to 30 to 60 minutes a day on 5 or more days of the week.  Take a fiber supplement, such as Citrucel or Metamucil, every day if needed. Read and follow all instructions on the label.  Schedule time each day for a bowel movement. Having a daily routine may help. Take your time and do not strain when having a bowel movement.  Avoid alcohol, caffeine, and spicy foods, especially if they make your symptoms worse.    There are several different types of prostatitis.  You may be prescribed additional medications if your doctor feels you have a condition

## 2024-02-12 NOTE — PROGRESS NOTES
HISTORY OF PRESENT ILLNESS  Briana Angeles is a 78 y.o. male.  Chief Complaint   Patient presents with    Follow-up    prostatitis      He saw Dr. Ospina for cc: persistent penile discomfort. He has discomfort with sexual stimulation or erotic thoughts.  He was treated with Levaquin which did not help.   He feels a little better on prophylactic doxycycline.     Dr. Ospina placed him on the doxycycline and gave him a prescription for Uribel.  He could not afford the Uribel.  I started him on low dose nortriptyline, 10 mg bid, on 1/12/24.  He is here today in follow up.    He feels some noticeable improvement in symptoms.  He is up less overnight and he has only intermittent dysuria.  He feels a bit more fatigued mid-day after starting nortriptyline.    He is also on tamsulosin and finasteride for an enlarged prostate.  He was counseled on LEMP but declines at this time in favor of observation and continuation of medications.      PAST MEDICAL HISTORY:  PMHx (including negatives):  has a past medical history of A-fib (HCC), Anticoagulated, Dyslipidemia, HTN (hypertension), and Thrombocytopenia (HCC).   PSurgHx:  has no past surgical history on file.  PSocHx:       Review of Systems   Constitutional:  Negative for activity change, appetite change, chills, fever and unexpected weight change.   Gastrointestinal:  Negative for constipation.   Musculoskeletal:  Negative for gait problem.   Neurological:  Negative for light-headedness and headaches.   Psychiatric/Behavioral:  Negative for dysphoric mood, hallucinations and sleep disturbance. The patient is not nervous/anxious.          Physical Exam  Vitals and nursing note reviewed.   Constitutional:       Appearance: Normal appearance. He is not ill-appearing.   Musculoskeletal:         General: Normal range of motion.      Cervical back: Normal range of motion.   Neurological:      Mental Status: He is oriented to person, place, and time.       ASSESSMENT AND

## 2024-02-12 NOTE — ASSESSMENT & PLAN NOTE
Chronic irritation on doxycycline, tamsulosin, finasteride. Nortriptyline 10 mg bid, was added on 1/12/24.  He has some afternoon fatigue since starting nortriptyline.  He will decrease to 10 mg in the evenings.

## 2024-02-13 ENCOUNTER — OFFICE VISIT (OUTPATIENT)
Age: 79
End: 2024-02-13
Payer: MEDICARE

## 2024-02-13 VITALS — HEIGHT: 70 IN | BODY MASS INDEX: 24.62 KG/M2 | WEIGHT: 172 LBS

## 2024-02-13 DIAGNOSIS — N41.1 PROSTATITIS, CHRONIC: Primary | ICD-10-CM

## 2024-02-13 PROCEDURE — 99212 OFFICE O/P EST SF 10 MIN: CPT | Performed by: NURSE PRACTITIONER

## 2024-02-13 PROCEDURE — G8420 CALC BMI NORM PARAMETERS: HCPCS | Performed by: NURSE PRACTITIONER

## 2024-02-13 PROCEDURE — G8484 FLU IMMUNIZE NO ADMIN: HCPCS | Performed by: NURSE PRACTITIONER

## 2024-02-13 PROCEDURE — 4004F PT TOBACCO SCREEN RCVD TLK: CPT | Performed by: NURSE PRACTITIONER

## 2024-02-13 PROCEDURE — 1123F ACP DISCUSS/DSCN MKR DOCD: CPT | Performed by: NURSE PRACTITIONER

## 2024-02-13 PROCEDURE — G8427 DOCREV CUR MEDS BY ELIG CLIN: HCPCS | Performed by: NURSE PRACTITIONER

## 2024-02-13 RX ORDER — NORTRIPTYLINE HYDROCHLORIDE 10 MG/1
10 CAPSULE ORAL NIGHTLY
Qty: 30 CAPSULE | Refills: 2 | Status: SHIPPED | OUTPATIENT
Start: 2024-02-13

## 2024-02-13 RX ORDER — DOXYCYCLINE HYCLATE 100 MG/1
100 CAPSULE ORAL DAILY
Qty: 30 CAPSULE | Refills: 0 | Status: SHIPPED | OUTPATIENT
Start: 2024-02-13 | End: 2024-03-14

## 2024-04-12 ENCOUNTER — OFFICE VISIT (OUTPATIENT)
Age: 79
End: 2024-04-12
Payer: MEDICARE

## 2024-04-12 VITALS
SYSTOLIC BLOOD PRESSURE: 120 MMHG | OXYGEN SATURATION: 98 % | RESPIRATION RATE: 16 BRPM | HEIGHT: 70 IN | DIASTOLIC BLOOD PRESSURE: 77 MMHG | BODY MASS INDEX: 24.77 KG/M2 | HEART RATE: 92 BPM | WEIGHT: 173 LBS

## 2024-04-12 DIAGNOSIS — N40.1 BENIGN PROSTATIC HYPERPLASIA WITH WEAK URINARY STREAM: ICD-10-CM

## 2024-04-12 DIAGNOSIS — N35.112 POSTINFECTIVE STRICTURE OF BULBOUS URETHRA: ICD-10-CM

## 2024-04-12 DIAGNOSIS — N41.1 PROSTATITIS, CHRONIC: Primary | ICD-10-CM

## 2024-04-12 DIAGNOSIS — R39.12 BENIGN PROSTATIC HYPERPLASIA WITH WEAK URINARY STREAM: ICD-10-CM

## 2024-04-12 LAB
BILIRUBIN, URINE, POC: NEGATIVE
BLOOD URINE, POC: NEGATIVE
GLUCOSE URINE, POC: NEGATIVE
KETONES, URINE, POC: NORMAL
LEUKOCYTE ESTERASE, URINE, POC: NEGATIVE
NITRITE, URINE, POC: NEGATIVE
PH, URINE, POC: 5.5 (ref 4.6–8)
PROTEIN,URINE, POC: NEGATIVE
SPECIFIC GRAVITY, URINE, POC: 1.02 (ref 1–1.03)
URINALYSIS CLARITY, POC: CLEAR
URINALYSIS COLOR, POC: YELLOW
UROBILINOGEN, POC: NORMAL

## 2024-04-12 PROCEDURE — 1123F ACP DISCUSS/DSCN MKR DOCD: CPT | Performed by: UROLOGY

## 2024-04-12 PROCEDURE — G8427 DOCREV CUR MEDS BY ELIG CLIN: HCPCS | Performed by: UROLOGY

## 2024-04-12 PROCEDURE — 99214 OFFICE O/P EST MOD 30 MIN: CPT | Performed by: UROLOGY

## 2024-04-12 PROCEDURE — 4004F PT TOBACCO SCREEN RCVD TLK: CPT | Performed by: UROLOGY

## 2024-04-12 PROCEDURE — 81001 URINALYSIS AUTO W/SCOPE: CPT | Performed by: UROLOGY

## 2024-04-12 PROCEDURE — G8420 CALC BMI NORM PARAMETERS: HCPCS | Performed by: UROLOGY

## 2024-04-12 RX ORDER — NORTRIPTYLINE HYDROCHLORIDE 10 MG/1
10 CAPSULE ORAL NIGHTLY
Qty: 90 CAPSULE | Refills: 3 | Status: SHIPPED | OUTPATIENT
Start: 2024-04-12

## 2024-04-12 NOTE — ASSESSMENT & PLAN NOTE
Large prostate with probable chronic prostatitis.  On tamsulosin.  Stable.  His large gland size may be a factor in his not clearing inflammation.  He has deferred surgical management of BPH.

## 2024-04-12 NOTE — PROGRESS NOTES
HISTORY OF PRESENT ILLNESS  Briana Angeles is a 78 y.o. male.   has a past medical history of A-fib (HCC), Anticoagulated, Dyslipidemia, HTN (hypertension), and Thrombocytopenia (HCC).  has no past surgical history on file.  Chief Complaint   Patient presents with    Follow-up     Prostatitis, chronic     He was last seen by me on 1/12/2024 for prostatitis.  He also has BPH and a weak stream.  He was recovering from COVID at that time.  We decided on suppressive doxycycline and was given a 1 month prescription.  This was refilled for an additional month.  He is still on this.  He was also since started on nortriptyline 10 mg.    He feels much better.  Nocturia 2x down from 4x.  No pain.         1. Prostatitis, chronic  Overview:  Chronic irritation.  Worse with sexual intercourse or stimulation.  Give RX for suppressive doxycycline 1/12/24.  Assessment & Plan:   Chronic prostate irritation.  Improved.  Finish doxycycline and assess need to continue.    Continue nortriptyline.  Rx renewed.   Orders:  -     AMB POC URINALYSIS DIP STICK AUTO W/ MICRO  -     nortriptyline (PAMELOR) 10 MG capsule; Take 1 capsule by mouth nightly, Disp-90 capsule, R-3Normal  2. Benign prostatic hyperplasia with weak urinary stream  Overview:  BPH with weak stream.  He has been counseled on a LEMP, but is still deciding if he wants to pursue surgical intervention.  Assessment & Plan:  Large prostate with probable chronic prostatitis.  On tamsulosin, finasteride and tadalafil.  Stable symptoms.  His large gland size may be a factor in his not clearing inflammation.  He has deferred surgical management of BPH.  Orders:  -     AMB POC URINALYSIS DIP STICK AUTO W/ MICRO  3. Postinfective stricture of bulbous urethra  Overview:  Bulbar urethral stricture, associated with urethritis.  Dilated with cystoscope on 11/17/23.      No Known Allergies   Prior to Admission medications    Medication Sig Start Date End Date Taking? Authorizing Provider

## 2024-04-12 NOTE — ASSESSMENT & PLAN NOTE
Chronic prostate irritation.  Improved.  Finish doxycycline and assess need to continue.    Continue nortriptyline.  Rx renewed.

## 2024-04-12 NOTE — PROGRESS NOTES
Chief Complaint   Patient presents with    Follow-up     Prostatitis, chronic     1. Have you been to the ER, urgent care clinic since your last visit?  Hospitalized since your last visit?No    2. Have you seen or consulted any other health care providers outside of the [facility] since your last visit?  Include any pap smears or colon screening. No  /77 (Site: Left Upper Arm, Position: Sitting, Cuff Size: Medium Adult)   Pulse 92   Resp 16   Ht 1.778 m (5' 10\")   Wt 78.5 kg (173 lb)   SpO2 98%   BMI 24.82 kg/m²

## 2024-08-02 ENCOUNTER — OFFICE VISIT (OUTPATIENT)
Age: 79
End: 2024-08-02
Payer: MEDICARE

## 2024-08-02 VITALS
HEART RATE: 94 BPM | HEIGHT: 70 IN | WEIGHT: 173 LBS | SYSTOLIC BLOOD PRESSURE: 121 MMHG | BODY MASS INDEX: 24.77 KG/M2 | DIASTOLIC BLOOD PRESSURE: 79 MMHG

## 2024-08-02 DIAGNOSIS — N41.1 PROSTATITIS, CHRONIC: ICD-10-CM

## 2024-08-02 DIAGNOSIS — R39.12 BENIGN PROSTATIC HYPERPLASIA WITH WEAK URINARY STREAM: Primary | ICD-10-CM

## 2024-08-02 DIAGNOSIS — N35.112 POSTINFECTIVE STRICTURE OF BULBOUS URETHRA: ICD-10-CM

## 2024-08-02 DIAGNOSIS — N40.1 BENIGN PROSTATIC HYPERPLASIA WITH WEAK URINARY STREAM: Primary | ICD-10-CM

## 2024-08-02 PROCEDURE — 4004F PT TOBACCO SCREEN RCVD TLK: CPT | Performed by: UROLOGY

## 2024-08-02 PROCEDURE — 99214 OFFICE O/P EST MOD 30 MIN: CPT | Performed by: UROLOGY

## 2024-08-02 PROCEDURE — G8427 DOCREV CUR MEDS BY ELIG CLIN: HCPCS | Performed by: UROLOGY

## 2024-08-02 PROCEDURE — G8420 CALC BMI NORM PARAMETERS: HCPCS | Performed by: UROLOGY

## 2024-08-02 PROCEDURE — 1123F ACP DISCUSS/DSCN MKR DOCD: CPT | Performed by: UROLOGY

## 2024-08-02 RX ORDER — TADALAFIL 5 MG/1
5 TABLET ORAL DAILY
Qty: 90 TABLET | Refills: 3 | Status: SHIPPED | OUTPATIENT
Start: 2024-08-02

## 2024-08-02 RX ORDER — FINASTERIDE 5 MG/1
5 TABLET, FILM COATED ORAL DAILY
Qty: 90 TABLET | Refills: 3 | Status: SHIPPED | OUTPATIENT
Start: 2024-10-01

## 2024-08-02 RX ORDER — TAMSULOSIN HYDROCHLORIDE 0.4 MG/1
0.4 CAPSULE ORAL DAILY
Qty: 90 CAPSULE | Refills: 3 | Status: SHIPPED | OUTPATIENT
Start: 2024-10-01

## 2024-08-02 NOTE — PROGRESS NOTES
Chief Complaint   Patient presents with    Follow-up     Prostatitis, chronic     1. Have you been to the ER, urgent care clinic since your last visit?  Hospitalized since your last visit?No    2. Have you seen or consulted any other health care providers outside of the [facility] since your last visit?  Include any pap smears or colon screening. No  /79   Pulse 94   Ht 1.778 m (5' 10\")   Wt 78.5 kg (173 lb)   BMI 24.82 kg/m²

## 2024-08-02 NOTE — ASSESSMENT & PLAN NOTE
Stable urination on finasteride, tamsulosin and tadalafil.  He is comfortable with observation.

## 2024-08-02 NOTE — PROGRESS NOTES
HISTORY OF PRESENT ILLNESS  Briana Angeles is a 78 y.o. male.   has a past medical history of A-fib (HCC), Anticoagulated, Dyslipidemia, HTN (hypertension), and Thrombocytopenia (HCC).  has no past surgical history on file.  Chief Complaint   Patient presents with    Follow-up     Prostatitis, chronic     He has been counseled on a LEMP, but is still deciding if he wants to pursue surgical intervention.    He drinks 2 cups of coffee in the morning.  He is not drinking much else in the mornings.  His nocturia is variable 1-2 per night.  His flow is not so good and slow.  It is stable.     He is still sexually active with decreased ejaculation.         1. Benign prostatic hyperplasia with weak urinary stream  Overview:  BPH with weak stream.    Assessment & Plan:   Stable urination on finasteride, tamsulosin and tadalafil.  He is comfortable with observation.    Orders:  -     finasteride (PROSCAR) 5 MG tablet; Take 1 tablet by mouth daily, Disp-90 tablet, R-3Normal  -     tadalafil (CIALIS) 5 MG tablet; Take 1 tablet by mouth daily, Disp-90 tablet, R-3Normal  -     tamsulosin (FLOMAX) 0.4 MG capsule; Take 1 capsule by mouth daily, Disp-90 capsule, R-3Normal  2. Prostatitis, chronic  Overview:  Chronic irritation.  Worse with sexual intercourse or stimulation.    Assessment & Plan:   Off doxycycline.  Stable urinary symptoms. On Nortriptyline which may help.   3. Postinfective stricture of bulbous urethra  Overview:  Bulbar urethral stricture, associated with urethritis.  Dilated with cystoscope on 11/17/23.  Assessment & Plan:   Chronic slow flow.       No Known Allergies   Prior to Admission medications    Medication Sig Start Date End Date Taking? Authorizing Provider   finasteride (PROSCAR) 5 MG tablet Take 1 tablet by mouth daily 10/1/24  Yes Aguila Ospina MD   tadalafil (CIALIS) 5 MG tablet Take 1 tablet by mouth daily 8/2/24  Yes Aguila Ospina MD   tamsulosin (FLOMAX) 0.4 MG capsule Take 1 capsule by mouth

## 2024-08-26 DIAGNOSIS — N41.1 PROSTATITIS, CHRONIC: ICD-10-CM

## 2024-08-26 RX ORDER — DOXYCYCLINE HYCLATE 100 MG
100 TABLET ORAL 2 TIMES DAILY
Qty: 42 TABLET | Refills: 2 | Status: SHIPPED | OUTPATIENT
Start: 2024-08-26 | End: 2024-12-31

## 2024-08-26 RX ORDER — NORTRIPTYLINE HCL 10 MG
10 CAPSULE ORAL NIGHTLY
Qty: 90 CAPSULE | Refills: 3 | Status: SHIPPED | OUTPATIENT
Start: 2024-08-26

## 2025-03-28 ENCOUNTER — TELEPHONE (OUTPATIENT)
Age: 80
End: 2025-03-28

## 2025-03-28 RX ORDER — DOXYCYCLINE HYCLATE 100 MG
100 TABLET ORAL 2 TIMES DAILY
Qty: 42 TABLET | Refills: 1 | Status: SHIPPED | OUTPATIENT
Start: 2025-03-28 | End: 2025-08-29

## 2025-03-28 NOTE — TELEPHONE ENCOUNTER
Pt asked for refill of doxcycline for prostatitis be sent to [facility] pharmacy

## 2025-03-28 NOTE — TELEPHONE ENCOUNTER
Patient called requesting a refill on Doxycycline. He stated that he is having the same symptoms he usually has and this rx typically helps.     He is also requesting if you could give him a call. [phone]